# Patient Record
Sex: MALE | Race: WHITE | NOT HISPANIC OR LATINO | URBAN - METROPOLITAN AREA
[De-identification: names, ages, dates, MRNs, and addresses within clinical notes are randomized per-mention and may not be internally consistent; named-entity substitution may affect disease eponyms.]

---

## 2020-02-29 ENCOUNTER — EMERGENCY (EMERGENCY)
Facility: HOSPITAL | Age: 30
LOS: 1 days | Discharge: HOME | End: 2020-02-29
Admitting: EMERGENCY MEDICINE
Payer: COMMERCIAL

## 2020-02-29 VITALS
OXYGEN SATURATION: 99 % | SYSTOLIC BLOOD PRESSURE: 129 MMHG | DIASTOLIC BLOOD PRESSURE: 80 MMHG | WEIGHT: 209.44 LBS | HEIGHT: 67 IN | HEART RATE: 93 BPM | RESPIRATION RATE: 17 BRPM | TEMPERATURE: 99 F

## 2020-02-29 LAB
APPEARANCE UR: CLEAR — SIGNIFICANT CHANGE UP
BILIRUB UR-MCNC: NEGATIVE — SIGNIFICANT CHANGE UP
COLOR SPEC: YELLOW — SIGNIFICANT CHANGE UP
DIFF PNL FLD: NEGATIVE — SIGNIFICANT CHANGE UP
GLUCOSE UR QL: NEGATIVE — SIGNIFICANT CHANGE UP
KETONES UR-MCNC: NEGATIVE — SIGNIFICANT CHANGE UP
LEUKOCYTE ESTERASE UR-ACNC: NEGATIVE — SIGNIFICANT CHANGE UP
NITRITE UR-MCNC: NEGATIVE — SIGNIFICANT CHANGE UP
PH UR: 6 — SIGNIFICANT CHANGE UP (ref 5–8)
PROT UR-MCNC: NEGATIVE MG/DL — SIGNIFICANT CHANGE UP
SP GR SPEC: 1.02 — SIGNIFICANT CHANGE UP (ref 1–1.03)
UROBILINOGEN FLD QL: 0.2 E.U./DL — SIGNIFICANT CHANGE UP

## 2020-02-29 PROCEDURE — 99284 EMERGENCY DEPT VISIT MOD MDM: CPT

## 2020-02-29 RX ORDER — CYCLOBENZAPRINE HYDROCHLORIDE 10 MG/1
1 TABLET, FILM COATED ORAL
Qty: 9 | Refills: 0
Start: 2020-02-29 | End: 2020-03-02

## 2020-02-29 RX ORDER — KETOROLAC TROMETHAMINE 30 MG/ML
30 SYRINGE (ML) INJECTION ONCE
Refills: 0 | Status: DISCONTINUED | OUTPATIENT
Start: 2020-02-29 | End: 2020-02-29

## 2020-02-29 RX ORDER — DIAZEPAM 5 MG
5 TABLET ORAL ONCE
Refills: 0 | Status: DISCONTINUED | OUTPATIENT
Start: 2020-02-29 | End: 2020-02-29

## 2020-02-29 RX ORDER — LIDOCAINE 4 G/100G
1 CREAM TOPICAL ONCE
Refills: 0 | Status: COMPLETED | OUTPATIENT
Start: 2020-02-29 | End: 2020-02-29

## 2020-02-29 RX ORDER — OXYCODONE AND ACETAMINOPHEN 5; 325 MG/1; MG/1
1 TABLET ORAL ONCE
Refills: 0 | Status: DISCONTINUED | OUTPATIENT
Start: 2020-02-29 | End: 2020-02-29

## 2020-02-29 RX ADMIN — Medication 30 MILLIGRAM(S): at 22:30

## 2020-02-29 RX ADMIN — LIDOCAINE 1 PATCH: 4 CREAM TOPICAL at 22:30

## 2020-02-29 RX ADMIN — Medication 5 MILLIGRAM(S): at 23:00

## 2020-02-29 NOTE — ED PROVIDER NOTE - CLINICAL SUMMARY MEDICAL DECISION MAKING FREE TEXT BOX
lbp x 3 days. no direct trauma. no midline tenderness. neuro intact. no indication for imaging lbp x 3 days. no direct trauma. no midline tenderness. neuro intact. no indication for imaging. no red flags.

## 2020-02-29 NOTE — ED PROVIDER NOTE - PROGRESS NOTE DETAILS
patient reports pain improved. Patient presents with low back pain, most likely of musculoskeletal origin; there is no midline ttp; no neurologic signs or symptoms on history or exam; no numbness/tingling or paresthesias; gait is normal, no bowel or bladder incontinence; no evidence of cord compression or cauda equina syndrome; in addition, there is no fever and no risk factors or evidence of epidural abscess; no evidence of cord transection or metastatic process or other acute spinal cord injury.  Patient has a normal neuro exam and back exam; no evidence of AAA/dissection; given pain meds and patient will f/u in clinic or with PMD.  Looks well on d/c with normal gait and reflexes.

## 2020-02-29 NOTE — ED PROVIDER NOTE - PATIENT PORTAL LINK FT
You can access the FollowMyHealth Patient Portal offered by [facility] by registering at the following website: [email or web address]. By joining [employer]’s FollowMyHealth portal, you will also be able to view your health information using other applications (apps) compatible with our system.

## 2020-02-29 NOTE — ED PROVIDER NOTE - OBJECTIVE STATEMENT
29 year old male with no PMhx presents with low back pain x 3 days. no fall or trauma. reports picked up trash bags and mild pain began worsened over the past 2 days. no radiation of pain. no numbness/tingling.   Denies fever, chills, dysuria, hematuria, flank pain, change in urinary/bowel function, numbness, tingling, focal weakness, abdominal pain, N/V/D/C, melena, hematochezia, CP, SOB, palpitations, diaphoresis, dizziness, HA, cough, rash, trauma, and fall.

## 2020-03-01 RX ADMIN — OXYCODONE AND ACETAMINOPHEN 1 TABLET(S): 5; 325 TABLET ORAL at 00:19

## 2020-03-04 DIAGNOSIS — M54.5 LOW BACK PAIN: ICD-10-CM

## 2020-03-04 DIAGNOSIS — M54.9 DORSALGIA, UNSPECIFIED: ICD-10-CM

## 2021-08-21 ENCOUNTER — EMERGENCY (EMERGENCY)
Facility: HOSPITAL | Age: 31
LOS: 0 days | Discharge: HOME | End: 2021-08-21
Payer: OTHER MISCELLANEOUS

## 2021-08-21 VITALS
OXYGEN SATURATION: 99 % | HEART RATE: 81 BPM | TEMPERATURE: 98 F | DIASTOLIC BLOOD PRESSURE: 86 MMHG | RESPIRATION RATE: 17 BRPM | SYSTOLIC BLOOD PRESSURE: 138 MMHG

## 2021-08-21 VITALS
WEIGHT: 210.1 LBS | RESPIRATION RATE: 16 BRPM | TEMPERATURE: 98 F | DIASTOLIC BLOOD PRESSURE: 87 MMHG | OXYGEN SATURATION: 98 % | SYSTOLIC BLOOD PRESSURE: 138 MMHG | HEIGHT: 67 IN | HEART RATE: 78 BPM
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
## 2021-08-21 DIAGNOSIS — S61.312A LACERATION WITHOUT FOREIGN BODY OF RIGHT MIDDLE FINGER WITH DAMAGE TO NAIL, INITIAL ENCOUNTER: ICD-10-CM
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
## 2021-08-21 PROCEDURE — 99284 EMERGENCY DEPT VISIT MOD MDM: CPT

## 2021-08-21 PROCEDURE — 73140 X-RAY EXAM OF FINGER(S): CPT | Mod: 26,LT

## 2021-08-21 RX ORDER — CEPHALEXIN 500 MG
500 CAPSULE ORAL ONCE
Refills: 0 | Status: COMPLETED | OUTPATIENT
Start: 2021-08-21 | End: 2021-08-21

## 2021-08-21 RX ORDER — IBUPROFEN 200 MG
1 TABLET ORAL
Qty: 20 | Refills: 0
Start: 2021-08-21 | End: 2021-08-25

## 2021-08-21 RX ORDER — IBUPROFEN 200 MG
600 TABLET ORAL ONCE
Refills: 0 | Status: COMPLETED | OUTPATIENT
Start: 2021-08-21 | End: 2021-08-21

## 2021-08-21 RX ORDER — CEPHALEXIN 500 MG
1 CAPSULE ORAL
Qty: 21 | Refills: 0
Start: 2021-08-21 | End: 2021-08-27

## 2021-08-21 RX ADMIN — Medication 600 MILLIGRAM(S): at 21:07

## 2021-08-21 RX ADMIN — Medication 600 MILLIGRAM(S): at 20:27

## 2021-08-21 RX ADMIN — Medication 500 MILLIGRAM(S): at 20:27

## 2021-08-21 NOTE — ED PROVIDER NOTE - CARE PLAN
1 Principal Discharge DX:	Laceration of middle finger without foreign body with damage to nail, initial encounter

## 2021-08-21 NOTE — ED PROVIDER NOTE - CLINICAL SUMMARY MEDICAL DECISION MAKING FREE TEXT BOX
Presents with lac over nail to 3rd finger, bleeding controlled.   xrays- Presents with lac over nail to 3rd finger, bleeding controlled.   xrays-no fx seen    cleane, dressed  keflex, motrin Presents with lac over nail to 3rd finger, bleeding controlled.  xrays-no fx seen  Cleaned with betadine and NS. Xeroform, sterile gauze.  keflex, motrin  RTER if any sx of infx.

## 2021-08-21 NOTE — ED PROVIDER NOTE - CARE PROVIDER_API CALL
Kayla Mcallister)  Plastic Surgery  [address] [place] [address], [address]  [place] [address]  Phone: [phone]  Fax: [phone]  Follow Up Time:

## 2021-08-21 NOTE — ED PROVIDER NOTE - PATIENT PORTAL LINK FT
You can access the FollowMyHealth Patient Portal offered by [facility] by registering at the following website: [email or web address]. By joining [employer]’s FollowMyHealth portal, you will also be able to view your health information using other applications (apps) compatible with our system.

## 2021-08-21 NOTE — ED PROVIDER NOTE - PHYSICAL EXAMINATION
NAD  +dressing to L 3rd finger. + about 5 mm x 7 mm nail avulsion/lac. no active bleeding, no bone seen. NAD.  LUE: +dressing to L 3rd finger. + about 5 mm x 7 mm nail avulsion/lac. no active bleeding, no bone seen. ROM intact.

## 2021-08-21 NOTE — ED ADULT NURSE NOTE - OBJECTIVE STATEMENT
31 year old male c/o pain to left hand 3rd digit laceration after cutting self with knife. Patient had similar episode in 2017 and had tetanus shot then.

## 2021-08-21 NOTE — ED ADULT NURSE NOTE - NSFALLRSKASSESSTYPE_ED_ALL_ED
Initial (On Arrival) [place] Counseling: I discussed with the patient the risks of tildrakizumab including but not limited to immunosuppression, malignancy, posterior leukoencephalopathy syndrome, and serious infections.  The patient understands that monitoring is required including a PPD at baseline and must alert us or the primary physician if symptoms of infection or other concerning signs are noted.

## 2021-08-21 NOTE — ED PROVIDER NOTE - OBJECTIVE STATEMENT
NKDA, NKFA  Meds- denies   PMhx- denies    30 y/o RHD M who denies pmhx present with lac to 3rd middle finger, reports cut off his nail about 5 pm today while cutting lettuce. [employer] ESQUIVEL.

## 2024-02-15 PROBLEM — Z78.9 OTHER SPECIFIED HEALTH STATUS: Chronic | Status: ACTIVE | Noted: 2020-02-29
